# Patient Record
Sex: FEMALE | Race: OTHER | HISPANIC OR LATINO
[De-identification: names, ages, dates, MRNs, and addresses within clinical notes are randomized per-mention and may not be internally consistent; named-entity substitution may affect disease eponyms.]

---

## 2021-03-22 ENCOUNTER — APPOINTMENT (OUTPATIENT)
Dept: ULTRASOUND IMAGING | Facility: CLINIC | Age: 51
End: 2021-03-22
Payer: MEDICAID

## 2021-03-22 PROCEDURE — 76700 US EXAM ABDOM COMPLETE: CPT

## 2021-06-26 ENCOUNTER — INPATIENT (INPATIENT)
Facility: HOSPITAL | Age: 51
LOS: 1 days | Discharge: HOME | End: 2021-06-28
Attending: FAMILY MEDICINE
Payer: MEDICAID

## 2021-06-26 PROCEDURE — 93010 ELECTROCARDIOGRAM REPORT: CPT

## 2021-06-26 PROCEDURE — 99291 CRITICAL CARE FIRST HOUR: CPT

## 2021-06-26 PROCEDURE — 71045 X-RAY EXAM CHEST 1 VIEW: CPT | Mod: 26

## 2021-06-27 ENCOUNTER — OUTPATIENT (OUTPATIENT)
Dept: OUTPATIENT SERVICES | Facility: HOSPITAL | Age: 51
LOS: 1 days | End: 2021-06-27

## 2021-06-28 ENCOUNTER — OUTPATIENT (OUTPATIENT)
Dept: OUTPATIENT SERVICES | Facility: HOSPITAL | Age: 51
LOS: 1 days | End: 2021-06-28

## 2023-10-05 ENCOUNTER — APPOINTMENT (OUTPATIENT)
Dept: MAMMOGRAPHY | Facility: CLINIC | Age: 53
End: 2023-10-05
Payer: MEDICAID

## 2023-10-05 PROCEDURE — 77067 SCR MAMMO BI INCL CAD: CPT

## 2023-10-05 PROCEDURE — 77063 BREAST TOMOSYNTHESIS BI: CPT

## 2024-08-26 ENCOUNTER — RESULT REVIEW (OUTPATIENT)
Age: 54
End: 2024-08-26

## 2024-09-04 PROBLEM — Z00.00 ENCOUNTER FOR PREVENTIVE HEALTH EXAMINATION: Status: ACTIVE | Noted: 2024-09-04

## 2024-09-18 ENCOUNTER — APPOINTMENT (OUTPATIENT)
Dept: VASCULAR SURGERY | Facility: CLINIC | Age: 54
End: 2024-09-18
Payer: MEDICAID

## 2024-09-18 ENCOUNTER — APPOINTMENT (OUTPATIENT)
Dept: ULTRASOUND IMAGING | Facility: CLINIC | Age: 54
End: 2024-09-18
Payer: COMMERCIAL

## 2024-09-18 VITALS
TEMPERATURE: 97.4 F | OXYGEN SATURATION: 97 % | BODY MASS INDEX: 45 KG/M2 | SYSTOLIC BLOOD PRESSURE: 130 MMHG | WEIGHT: 280 LBS | HEIGHT: 66 IN | HEART RATE: 87 BPM | DIASTOLIC BLOOD PRESSURE: 92 MMHG

## 2024-09-18 DIAGNOSIS — I82.409 ACUTE EMBOLISM AND THROMBOSIS OF UNSPECIFIED DEEP VEINS OF UNSPECIFIED LOWER EXTREMITY: ICD-10-CM

## 2024-09-18 DIAGNOSIS — Z09 ENCOUNTER FOR FOLLOW-UP EXAMINATION AFTER COMPLETED TREATMENT FOR CONDITIONS OTHER THAN MALIGNANT NEOPLASM: ICD-10-CM

## 2024-09-18 DIAGNOSIS — Z90.49 ACQUIRED ABSENCE OF OTHER SPECIFIED PARTS OF DIGESTIVE TRACT: ICD-10-CM

## 2024-09-18 PROCEDURE — 99024 POSTOP FOLLOW-UP VISIT: CPT

## 2024-09-18 PROCEDURE — 93971 EXTREMITY STUDY: CPT | Mod: RT

## 2024-09-18 NOTE — HISTORY OF PRESENT ILLNESS
[de-identified] : 53 yo female s/p robotic cholecystectomy.  Complains of intermittent epigastric pain, worse in the morning after waking up.  Is already on protonix. Is tolerating diet and having bowel function.    Also reports that since surgery, she has been having swelling and pain in the RLE.  Also reports some shortness of breath.

## 2024-09-18 NOTE — PHYSICAL EXAM
[Normal Breath Sounds] : Normal breath sounds [Normal Heart Sounds] : normal heart sounds [de-identified] : NAD [de-identified] : ELPIDIO [de-identified] : soft, NT, ND incisions c/d/i No RUQ tenderness

## 2024-09-18 NOTE — ASSESSMENT
[FreeTextEntry1] : pain and swelling in the RLE after surgery.  Will need to r/o DVT.   Stat RLE duplex.

## 2024-09-23 DIAGNOSIS — Z78.9 OTHER SPECIFIED HEALTH STATUS: ICD-10-CM

## 2024-09-23 DIAGNOSIS — I82.90 ACUTE EMBOLISM AND THROMBOSIS OF UNSPECIFIED VEIN: ICD-10-CM

## 2024-09-23 DIAGNOSIS — Z87.19 PERSONAL HISTORY OF OTHER DISEASES OF THE DIGESTIVE SYSTEM: ICD-10-CM

## 2024-09-23 RX ORDER — PANTOPRAZOLE 40 MG/1
TABLET, DELAYED RELEASE ORAL DAILY
Refills: 0 | Status: ACTIVE | COMMUNITY

## 2024-09-23 RX ORDER — TIRZEPATIDE 7.5 MG/.5ML
INJECTION, SOLUTION SUBCUTANEOUS
Refills: 0 | Status: ACTIVE | COMMUNITY

## 2025-02-17 ENCOUNTER — APPOINTMENT (OUTPATIENT)
Dept: RADIOLOGY | Facility: CLINIC | Age: 55
End: 2025-02-17
Payer: COMMERCIAL

## 2025-02-17 PROCEDURE — 73564 X-RAY EXAM KNEE 4 OR MORE: CPT | Mod: 50

## 2025-05-13 ENCOUNTER — APPOINTMENT (OUTPATIENT)
Dept: ULTRASOUND IMAGING | Facility: CLINIC | Age: 55
End: 2025-05-13
Payer: COMMERCIAL

## 2025-05-13 PROCEDURE — 93975 VASCULAR STUDY: CPT

## 2025-05-13 PROCEDURE — 76775 US EXAM ABDO BACK WALL LIM: CPT

## 2025-05-13 PROCEDURE — 76775 US EXAM ABDO BACK WALL LIM: CPT | Mod: 59

## 2025-05-20 ENCOUNTER — APPOINTMENT (OUTPATIENT)
Dept: MRI IMAGING | Facility: CLINIC | Age: 55
End: 2025-05-20
Payer: COMMERCIAL

## 2025-05-20 PROCEDURE — 70551 MRI BRAIN STEM W/O DYE: CPT

## 2025-06-26 ENCOUNTER — TRANSCRIPTION ENCOUNTER (OUTPATIENT)
Age: 55
End: 2025-06-26